# Patient Record
Sex: MALE | URBAN - METROPOLITAN AREA
[De-identification: names, ages, dates, MRNs, and addresses within clinical notes are randomized per-mention and may not be internally consistent; named-entity substitution may affect disease eponyms.]

---

## 2021-08-02 ENCOUNTER — OFFICE VISIT (OUTPATIENT)
Dept: URBAN - METROPOLITAN AREA CLINIC 88 | Facility: CLINIC | Age: 61
End: 2021-08-02
Payer: COMMERCIAL

## 2021-08-02 DIAGNOSIS — H52.4 PRESBYOPIA: ICD-10-CM

## 2021-08-02 DIAGNOSIS — E11.9 TYPE 2 DIABETES MELLITUS WITHOUT COMPLICATIONS: Primary | ICD-10-CM

## 2021-08-02 PROCEDURE — 99203 OFFICE O/P NEW LOW 30 MIN: CPT | Performed by: OPHTHALMOLOGY

## 2021-08-02 ASSESSMENT — VISUAL ACUITY
OS: 20/25
OD: 20/25

## 2021-08-02 ASSESSMENT — KERATOMETRY
OD: 40.38
OS: 41.00

## 2021-08-02 ASSESSMENT — INTRAOCULAR PRESSURE
OD: 12
OS: 13

## 2021-08-02 NOTE — IMPRESSION/PLAN
Impression: Type 2 diabetes mellitus without complications: I95.5. Plan: Diabetes type II: no background retinopathy, no signs of neovascularization noted. Discussed ocular and systemic benefits of blood sugar control.

## 2021-08-02 NOTE — IMPRESSION/PLAN
Impression: Presbyopia: H52.4.  Plan: Patient has a upcoming appointment at [facility] wear for a Refraction